# Patient Record
(demographics unavailable — no encounter records)

---

## 2024-12-30 NOTE — DISCUSSION/SUMMARY
[FreeTextEntry1] : MILADYS is a 68 year female who presents for On exam, negative CST, normal PVR, no POP.   [] []   f/u All questions answered.

## 2024-12-30 NOTE — ADDENDUM
[FreeTextEntry1] : This note was written by Dana Simeon, acting as the  for Dr. Hernandez. This note accurately reflects the work and decisions made by Dr. Hernandez.

## 2024-12-30 NOTE — HISTORY OF PRESENT ILLNESS
[FreeTextEntry1] : MILADYS is a 68 year female who presents for She was referred by      Daytime frequency:  Nocturia:  Urinary urgency:  Leakage of urine with urgency:  Leakage of urine with coughing sneezing laughing:  Incontinence pad use:  Sensation of incomplete bladder emptying:  History of frequent urinary tract infections:  History of hematuria:  Previous treatment:  Vaginal symptoms:  Bowel symptoms:      OB:  GYN: LMP, pap, estrogen use PMH:  PSH:  Meds:  Allx: